# Patient Record
Sex: FEMALE | Race: BLACK OR AFRICAN AMERICAN | NOT HISPANIC OR LATINO | Employment: OTHER | RURAL
[De-identification: names, ages, dates, MRNs, and addresses within clinical notes are randomized per-mention and may not be internally consistent; named-entity substitution may affect disease eponyms.]

---

## 2022-08-07 ENCOUNTER — OFFICE VISIT (OUTPATIENT)
Dept: FAMILY MEDICINE | Facility: CLINIC | Age: 42
End: 2022-08-07
Payer: OTHER GOVERNMENT

## 2022-08-07 VITALS
HEIGHT: 60 IN | SYSTOLIC BLOOD PRESSURE: 128 MMHG | TEMPERATURE: 98 F | WEIGHT: 155 LBS | BODY MASS INDEX: 30.43 KG/M2 | RESPIRATION RATE: 18 BRPM | DIASTOLIC BLOOD PRESSURE: 74 MMHG | HEART RATE: 72 BPM

## 2022-08-07 DIAGNOSIS — J02.9 PHARYNGITIS, UNSPECIFIED ETIOLOGY: Primary | ICD-10-CM

## 2022-08-07 DIAGNOSIS — Z20.822 EXPOSURE TO COVID-19 VIRUS: ICD-10-CM

## 2022-08-07 LAB
CTP QC/QA: YES
SARS-COV-2 AG RESP QL IA.RAPID: NEGATIVE

## 2022-08-07 PROCEDURE — 99203 PR OFFICE/OUTPT VISIT, NEW, LEVL III, 30-44 MIN: ICD-10-PCS | Mod: ,,, | Performed by: FAMILY MEDICINE

## 2022-08-07 PROCEDURE — 99203 OFFICE O/P NEW LOW 30 MIN: CPT | Mod: ,,, | Performed by: FAMILY MEDICINE

## 2022-08-07 PROCEDURE — 87426 SARS CORONAVIRUS 2 ANTIGEN POCT: ICD-10-PCS | Mod: QW,,, | Performed by: FAMILY MEDICINE

## 2022-08-07 PROCEDURE — 87426 SARSCOV CORONAVIRUS AG IA: CPT | Mod: QW,,, | Performed by: FAMILY MEDICINE

## 2022-08-07 RX ORDER — AZITHROMYCIN 250 MG/1
TABLET, FILM COATED ORAL
Qty: 6 TABLET | Refills: 0 | Status: SHIPPED | OUTPATIENT
Start: 2022-08-07 | End: 2023-03-28

## 2022-08-07 RX ORDER — PREDNISONE 20 MG/1
20 TABLET ORAL DAILY
Qty: 3 TABLET | Refills: 0 | Status: SHIPPED | OUTPATIENT
Start: 2022-08-07 | End: 2022-08-10

## 2022-08-07 NOTE — PROGRESS NOTES
Subjective:       Patient ID: Reyes Schulte is a 41 y.o. female.    Chief Complaint: Sore Throat (Mild sore throat.  positive today so request testing)    Pt  positive for covid earlier today, has been around him.     Review of Systems   Constitutional: Negative for activity change, appetite change, chills, diaphoresis, fatigue, fever and unexpected weight change.   HENT: Positive for nasal congestion and sore throat. Negative for dental problem, drooling, ear discharge, ear pain, facial swelling, hearing loss, mouth sores, nosebleeds, postnasal drip, rhinorrhea, sinus pressure/congestion, sneezing, tinnitus, trouble swallowing, voice change and goiter.    Eyes: Negative for photophobia, discharge, itching and visual disturbance.   Respiratory: Negative for apnea, cough, choking, chest tightness, shortness of breath, wheezing and stridor.    Cardiovascular: Negative for chest pain, palpitations, leg swelling and claudication.   Gastrointestinal: Negative for abdominal distention, abdominal pain, anal bleeding, blood in stool, change in bowel habit, constipation, diarrhea, nausea, vomiting and change in bowel habit.   Endocrine: Negative for cold intolerance, heat intolerance, polydipsia, polyphagia and polyuria.   Genitourinary: Negative for bladder incontinence, decreased urine volume, difficulty urinating, dysuria, enuresis, flank pain, frequency, hematuria, nocturia, pelvic pain and urgency.   Musculoskeletal: Negative for arthralgias, back pain, gait problem, joint swelling, leg pain, myalgias, neck pain, neck stiffness and joint deformity.   Integumentary:  Negative for pallor, rash, wound, breast mass and breast tenderness.   Allergic/Immunologic: Negative for environmental allergies, food allergies and immunocompromised state.   Neurological: Negative for dizziness, vertigo, tremors, seizures, syncope, facial asymmetry, speech difficulty, weakness, light-headedness, numbness,  headaches, disturbances in coordination, memory loss and coordination difficulties.   Hematological: Negative for adenopathy. Does not bruise/bleed easily.   Psychiatric/Behavioral: Negative for agitation, behavioral problems, confusion, decreased concentration, dysphoric mood, hallucinations, self-injury, sleep disturbance and suicidal ideas. The patient is not nervous/anxious and is not hyperactive.    Breast: Negative for mass and tenderness        Objective:      Physical Exam  Vitals reviewed.   Constitutional:       Appearance: Normal appearance.   HENT:      Head: Normocephalic and atraumatic.      Right Ear: Tympanic membrane, ear canal and external ear normal.      Left Ear: Tympanic membrane, ear canal and external ear normal.      Nose: Congestion and rhinorrhea present.      Mouth/Throat:      Mouth: Mucous membranes are moist.      Pharynx: Oropharynx is clear. Posterior oropharyngeal erythema present.   Eyes:      Extraocular Movements: Extraocular movements intact.      Conjunctiva/sclera: Conjunctivae normal.      Pupils: Pupils are equal, round, and reactive to light.   Cardiovascular:      Rate and Rhythm: Normal rate and regular rhythm.      Pulses: Normal pulses.      Heart sounds: Normal heart sounds.   Pulmonary:      Effort: Pulmonary effort is normal.      Breath sounds: Normal breath sounds.   Abdominal:      General: Bowel sounds are normal.      Palpations: Abdomen is soft.   Musculoskeletal:         General: Normal range of motion.      Cervical back: Normal range of motion and neck supple.   Skin:     General: Skin is warm and dry.   Neurological:      General: No focal deficit present.      Mental Status: She is alert. Mental status is at baseline.   Psychiatric:         Mood and Affect: Mood normal.         Behavior: Behavior normal.         Thought Content: Thought content normal.         Judgment: Judgment normal.         Assessment:       1. Pharyngitis, unspecified etiology    2.  Exposure to COVID-19 virus        Plan:     Pharyngitis, unspecified etiology    Exposure to COVID-19 virus  -     SARS Coronavirus 2 Antigen, POCT    Other orders  -     azithromycin (Z-ANA) 250 MG tablet; Take 2 tablets by mouth on day 1; Take 1 tablet by mouth on days 2-5  Dispense: 6 tablet; Refill: 0  -     predniSONE (DELTASONE) 20 MG tablet; Take 1 tablet (20 mg total) by mouth once daily. for 3 days  Dispense: 3 tablet; Refill: 0       Covid negative. Due to exposure will recommend taking home covid test tomorrow morning.

## 2022-08-07 NOTE — LETTER
August 7, 2022      [facility]  [address] [address]  Phone: [phone]  Fax: [phone]       Patient: Reyes Schulte   YOB: 1980  Date of Visit: 08/07/2022    To Whom It May Concern:    Sima Schulte  was at [facility] on 08/07/2022. The patient may return to work/school on 08/09/2022 with no restrictions. If you have any questions or concerns, or if I can be of further assistance, please do not hesitate to contact me.    Sincerely,    WILTON Puente CMA

## 2022-10-25 ENCOUNTER — OFFICE VISIT (OUTPATIENT)
Dept: FAMILY MEDICINE | Facility: CLINIC | Age: 42
End: 2022-10-25
Payer: OTHER GOVERNMENT

## 2022-10-25 VITALS
HEART RATE: 71 BPM | RESPIRATION RATE: 17 BRPM | HEIGHT: 60 IN | BODY MASS INDEX: 32.79 KG/M2 | DIASTOLIC BLOOD PRESSURE: 71 MMHG | WEIGHT: 167 LBS | SYSTOLIC BLOOD PRESSURE: 116 MMHG | OXYGEN SATURATION: 99 % | TEMPERATURE: 98 F

## 2022-10-25 DIAGNOSIS — R52 BODY ACHES: ICD-10-CM

## 2022-10-25 DIAGNOSIS — J32.9 SINUSITIS, UNSPECIFIED CHRONICITY, UNSPECIFIED LOCATION: Primary | ICD-10-CM

## 2022-10-25 LAB
CTP QC/QA: YES
FLUAV AG NPH QL: NEGATIVE
FLUBV AG NPH QL: NEGATIVE

## 2022-10-25 PROCEDURE — 87804 POCT INFLUENZA A/B: ICD-10-PCS | Mod: QW,,, | Performed by: FAMILY MEDICINE

## 2022-10-25 PROCEDURE — 99214 OFFICE O/P EST MOD 30 MIN: CPT | Mod: 25,,, | Performed by: FAMILY MEDICINE

## 2022-10-25 PROCEDURE — 99214 PR OFFICE/OUTPT VISIT, EST, LEVL IV, 30-39 MIN: ICD-10-PCS | Mod: 25,,, | Performed by: FAMILY MEDICINE

## 2022-10-25 PROCEDURE — 96372 THER/PROPH/DIAG INJ SC/IM: CPT | Mod: ,,, | Performed by: FAMILY MEDICINE

## 2022-10-25 PROCEDURE — 96372 PR INJECTION,THERAP/PROPH/DIAG2ST, IM OR SUBCUT: ICD-10-PCS | Mod: ,,, | Performed by: FAMILY MEDICINE

## 2022-10-25 PROCEDURE — 87804 INFLUENZA ASSAY W/OPTIC: CPT | Mod: QW,,, | Performed by: FAMILY MEDICINE

## 2022-10-25 RX ORDER — PRAZOSIN HYDROCHLORIDE 2 MG/1
CAPSULE ORAL
COMMUNITY
Start: 2022-01-24 | End: 2023-01-25

## 2022-10-25 RX ORDER — CHOLECALCIFEROL (VITAMIN D3) 50 MCG
50 TABLET ORAL
COMMUNITY
Start: 2022-01-04 | End: 2023-03-28

## 2022-10-25 RX ORDER — PREDNISONE 20 MG/1
20 TABLET ORAL DAILY
Qty: 5 TABLET | Refills: 0 | Status: SHIPPED | OUTPATIENT
Start: 2022-10-25 | End: 2022-10-30

## 2022-10-25 RX ORDER — DEXAMETHASONE SODIUM PHOSPHATE 4 MG/ML
4 INJECTION, SOLUTION INTRA-ARTICULAR; INTRALESIONAL; INTRAMUSCULAR; INTRAVENOUS; SOFT TISSUE
Status: COMPLETED | OUTPATIENT
Start: 2022-10-25 | End: 2022-10-25

## 2022-10-25 RX ORDER — TRAZODONE HYDROCHLORIDE 100 MG/1
TABLET ORAL
COMMUNITY
Start: 2022-01-24 | End: 2023-01-25

## 2022-10-25 RX ORDER — ONDANSETRON 8 MG/1
TABLET, ORALLY DISINTEGRATING ORAL
COMMUNITY
Start: 2021-11-01 | End: 2022-11-02

## 2022-10-25 RX ORDER — AMOXICILLIN AND CLAVULANATE POTASSIUM 875; 125 MG/1; MG/1
1 TABLET, FILM COATED ORAL 2 TIMES DAILY
Qty: 14 TABLET | Refills: 0 | Status: SHIPPED | OUTPATIENT
Start: 2022-10-25 | End: 2022-11-01

## 2022-10-25 RX ADMIN — DEXAMETHASONE SODIUM PHOSPHATE 4 MG: 4 INJECTION, SOLUTION INTRA-ARTICULAR; INTRALESIONAL; INTRAMUSCULAR; INTRAVENOUS; SOFT TISSUE at 11:10

## 2022-10-25 NOTE — LETTER
October 25, 2022      [facility]  [address] [address]  Phone: [phone]  Fax: [phone]       Patient: Reyes Schulte   YOB: 1980  Date of Visit: 10/25/2022    To Whom It May Concern:    Sima Schulte  was at [facility] on 10/25/2022. The patient may return to work/school on 10/27/2022 with no restrictions. If you have any questions or concerns, or if I can be of further assistance, please do not hesitate to contact me.    Sincerely,    Nellie Narayan LPN

## 2023-03-13 ENCOUNTER — LAB VISIT (OUTPATIENT)
Dept: PRIMARY CARE CLINIC | Facility: CLINIC | Age: 43
End: 2023-03-13
Payer: OTHER GOVERNMENT

## 2023-03-13 DIAGNOSIS — Z11.1 SCREENING-PULMONARY TB: Primary | ICD-10-CM

## 2023-03-13 DIAGNOSIS — Z78.9 UNKNOWN VARICELLA VACCINATION STATUS: ICD-10-CM

## 2023-03-13 DIAGNOSIS — Z78.9 MEASLES, MUMPS, RUBELLA (MMR) VACCINATION STATUS UNKNOWN: ICD-10-CM

## 2023-03-13 LAB — RUBV IGG SER-ACNC: 104.9 IU/ML

## 2023-03-13 PROCEDURE — 86481 TB AG RESPONSE T-CELL SUSP: CPT

## 2023-03-13 PROCEDURE — 86787 VARICELLA-ZOSTER ANTIBODY: CPT | Mod: ,,, | Performed by: NURSE PRACTITIONER

## 2023-03-13 PROCEDURE — 36415 PR COLLECTION VENOUS BLOOD,VENIPUNCTURE: ICD-10-PCS | Mod: ,,, | Performed by: NURSE PRACTITIONER

## 2023-03-13 PROCEDURE — 86765 RUBEOLA ANTIBODY: CPT | Mod: ,,, | Performed by: NURSE PRACTITIONER

## 2023-03-13 PROCEDURE — 86787 VARICELLA-ZOSTER ANTIBODY: CPT

## 2023-03-13 PROCEDURE — 86480 PR  TB TEST, CELL MEDIATED ANTIGEN RESPONSE,GAMMA INTERFRON: ICD-10-PCS | Mod: ,,, | Performed by: NURSE PRACTITIONER

## 2023-03-13 PROCEDURE — 86762 RUBELLA ANTIBODY: CPT

## 2023-03-13 PROCEDURE — 86765 PR  RUBEOLA: ICD-10-PCS | Mod: ,,, | Performed by: NURSE PRACTITIONER

## 2023-03-13 PROCEDURE — 86735 PR  MUMPS: ICD-10-PCS | Mod: ,,, | Performed by: NURSE PRACTITIONER

## 2023-03-13 PROCEDURE — 86762 RUBELLA ANTIBODY: CPT | Mod: ,,, | Performed by: NURSE PRACTITIONER

## 2023-03-13 PROCEDURE — 86765 RUBEOLA ANTIBODY: CPT

## 2023-03-13 PROCEDURE — 86480 TB TEST CELL IMMUN MEASURE: CPT | Mod: ,,, | Performed by: NURSE PRACTITIONER

## 2023-03-13 PROCEDURE — 86762 PR  RUBELLA: ICD-10-PCS | Mod: ,,, | Performed by: NURSE PRACTITIONER

## 2023-03-13 PROCEDURE — 86787 PR  VARICELLA-ZOSTER: ICD-10-PCS | Mod: ,,, | Performed by: NURSE PRACTITIONER

## 2023-03-13 PROCEDURE — 36415 COLL VENOUS BLD VENIPUNCTURE: CPT | Mod: ,,, | Performed by: NURSE PRACTITIONER

## 2023-03-13 PROCEDURE — 86735 MUMPS ANTIBODY: CPT

## 2023-03-13 PROCEDURE — 86735 MUMPS ANTIBODY: CPT | Mod: ,,, | Performed by: NURSE PRACTITIONER

## 2023-03-13 NOTE — PROGRESS NOTES
Subjective:       Patient ID: Reyes Schulte is a 42 y.o. female.    Chief Complaint: No chief complaint on file.    HPI  Review of Systems      Objective:      Physical Exam    Assessment:       Problem List Items Addressed This Visit    None  Visit Diagnoses       Screening-pulmonary TB    -  Primary    Relevant Orders    Quantiferon Gold TB    Measles, mumps, rubella (MMR) vaccination status unknown        Relevant Orders    Mumps, IgG Screen    Rubeola antibody IgG    Rubella antibody, IgG    Unknown varicella vaccination status        Relevant Orders    Varicella Zoster Antibody, IgG            Plan:       MMR Varicella and TB gold only

## 2023-03-15 LAB
MEASLES IGG INDEX: 1.45
MEV IGG SER QL: POSITIVE
MUMPS AB IGG INDEX: 0.75
MUV IGG SER QL IA: NEGATIVE
VARICELLA ZOSTER, BLOOD: POSITIVE
VZV IGG INDEX: >3.5 (ref 0–0.9)

## 2023-03-20 LAB
MITOGEN MINUS NIL RESULT, TB: >10
NIL RESULT, TB: 0.02
QUANTIFERON-TB GOLD PLUS RESULT: NEGATIVE
TB1 AG MINUS NIL RESULT: 0
TB2 AG MINUS NIL RESULT: 0

## 2023-03-28 ENCOUNTER — OFFICE VISIT (OUTPATIENT)
Dept: FAMILY MEDICINE | Facility: CLINIC | Age: 43
End: 2023-03-28
Payer: OTHER GOVERNMENT

## 2023-03-28 VITALS
TEMPERATURE: 98 F | OXYGEN SATURATION: 100 % | WEIGHT: 156 LBS | HEART RATE: 78 BPM | BODY MASS INDEX: 30.63 KG/M2 | HEIGHT: 60 IN | DIASTOLIC BLOOD PRESSURE: 82 MMHG | RESPIRATION RATE: 19 BRPM | SYSTOLIC BLOOD PRESSURE: 122 MMHG

## 2023-03-28 DIAGNOSIS — J01.00 ACUTE NON-RECURRENT MAXILLARY SINUSITIS: Primary | ICD-10-CM

## 2023-03-28 DIAGNOSIS — J02.9 SORE THROAT: ICD-10-CM

## 2023-03-28 DIAGNOSIS — R05.1 ACUTE COUGH: ICD-10-CM

## 2023-03-28 DIAGNOSIS — Z20.828 EXPOSURE TO VIRAL DISEASE: ICD-10-CM

## 2023-03-28 PROBLEM — G47.8 SLEEP DYSFUNCTION WITH AROUSAL DISTURBANCE: Status: ACTIVE | Noted: 2023-03-28

## 2023-03-28 PROBLEM — R06.83 SNORING: Status: ACTIVE | Noted: 2023-03-28

## 2023-03-28 PROBLEM — M21.40 PES PLANUS: Status: ACTIVE | Noted: 2023-03-28

## 2023-03-28 PROBLEM — F32.9 MAJOR DEPRESSIVE DISORDER: Status: ACTIVE | Noted: 2023-03-28

## 2023-03-28 PROBLEM — F10.20 ALCOHOL DEPENDENCE: Status: ACTIVE | Noted: 2023-03-28

## 2023-03-28 PROBLEM — F41.9 ANXIETY: Status: ACTIVE | Noted: 2023-03-28

## 2023-03-28 PROBLEM — G89.29 CHRONIC THORACIC BACK PAIN: Status: ACTIVE | Noted: 2023-03-28

## 2023-03-28 PROBLEM — M76.50 PATELLAR TENDINITIS: Status: ACTIVE | Noted: 2023-03-28

## 2023-03-28 PROBLEM — M54.6 CHRONIC THORACIC BACK PAIN: Status: ACTIVE | Noted: 2023-03-28

## 2023-03-28 LAB
CTP QC/QA: YES
CTP QC/QA: YES
FLUAV AG NPH QL: NEGATIVE
FLUBV AG NPH QL: NEGATIVE
S PYO RRNA THROAT QL PROBE: NEGATIVE
SARS-COV-2 AG RESP QL IA.RAPID: NEGATIVE

## 2023-03-28 PROCEDURE — 99213 PR OFFICE/OUTPT VISIT, EST, LEVL III, 20-29 MIN: ICD-10-PCS | Mod: 25,,, | Performed by: NURSE PRACTITIONER

## 2023-03-28 PROCEDURE — 96372 PR INJECTION,THERAP/PROPH/DIAG2ST, IM OR SUBCUT: ICD-10-PCS | Mod: ,,, | Performed by: NURSE PRACTITIONER

## 2023-03-28 PROCEDURE — 87880 POCT RAPID STREP A: ICD-10-PCS | Mod: QW,,, | Performed by: NURSE PRACTITIONER

## 2023-03-28 PROCEDURE — 99213 OFFICE O/P EST LOW 20 MIN: CPT | Mod: 25,,, | Performed by: NURSE PRACTITIONER

## 2023-03-28 PROCEDURE — 96372 THER/PROPH/DIAG INJ SC/IM: CPT | Mod: ,,, | Performed by: NURSE PRACTITIONER

## 2023-03-28 PROCEDURE — 87880 STREP A ASSAY W/OPTIC: CPT | Mod: QW,,, | Performed by: NURSE PRACTITIONER

## 2023-03-28 PROCEDURE — 87428 SARSCOV & INF VIR A&B AG IA: CPT | Mod: QW,,, | Performed by: NURSE PRACTITIONER

## 2023-03-28 PROCEDURE — 87428 POCT SARS-COV2 (COVID) WITH FLU ANTIGEN: ICD-10-PCS | Mod: QW,,, | Performed by: NURSE PRACTITIONER

## 2023-03-28 RX ORDER — DEXAMETHASONE SODIUM PHOSPHATE 4 MG/ML
8 INJECTION, SOLUTION INTRA-ARTICULAR; INTRALESIONAL; INTRAMUSCULAR; INTRAVENOUS; SOFT TISSUE ONCE
Status: COMPLETED | OUTPATIENT
Start: 2023-03-28 | End: 2023-03-28

## 2023-03-28 RX ORDER — PROMETHAZINE HYDROCHLORIDE AND DEXTROMETHORPHAN HYDROBROMIDE 6.25; 15 MG/5ML; MG/5ML
5 SYRUP ORAL EVERY 6 HOURS PRN
Qty: 240 ML | Refills: 0 | Status: SHIPPED | OUTPATIENT
Start: 2023-03-28 | End: 2023-04-07

## 2023-03-28 RX ORDER — CEFUROXIME AXETIL 500 MG/1
500 TABLET ORAL EVERY 12 HOURS
Qty: 14 TABLET | Refills: 0 | Status: SHIPPED | OUTPATIENT
Start: 2023-03-28 | End: 2023-04-04

## 2023-03-28 RX ORDER — FEXOFENADINE HCL AND PSEUDOEPHEDRINE HCI 60; 120 MG/1; MG/1
1 TABLET, EXTENDED RELEASE ORAL 2 TIMES DAILY
Qty: 20 TABLET | Refills: 0 | Status: SHIPPED | OUTPATIENT
Start: 2023-03-28 | End: 2023-04-07

## 2023-03-28 RX ADMIN — DEXAMETHASONE SODIUM PHOSPHATE 8 MG: 4 INJECTION, SOLUTION INTRA-ARTICULAR; INTRALESIONAL; INTRAMUSCULAR; INTRAVENOUS; SOFT TISSUE at 05:03

## 2023-03-28 NOTE — PROGRESS NOTES
Subjective:       Patient ID: Reyes Schulte is a 42 y.o. female.    Chief Complaint: Cough, Sore Throat, Chills, and Nasal Congestion    Cough, Sore Throat, Chills, and Nasal Congestion      Cough  Associated symptoms include chills and a sore throat. Pertinent negatives include no chest pain, ear pain, fever, headaches, rash or shortness of breath.   Sore Throat   Associated symptoms include congestion and coughing. Pertinent negatives include no abdominal pain, ear pain, headaches, neck pain, shortness of breath or vomiting.   Review of Systems   Constitutional:  Positive for chills. Negative for appetite change, fatigue and fever.   HENT:  Positive for nasal congestion and sore throat. Negative for ear pain.    Eyes:  Negative for pain, discharge and itching.   Respiratory:  Positive for cough. Negative for shortness of breath.    Cardiovascular:  Negative for chest pain and leg swelling.   Gastrointestinal:  Negative for abdominal pain, change in bowel habit, nausea, vomiting and change in bowel habit.   Musculoskeletal:  Negative for back pain, gait problem and neck pain.   Integumentary:  Negative for rash and wound.   Allergic/Immunologic: Negative for immunocompromised state.   Neurological:  Negative for dizziness, weakness and headaches.   All other systems reviewed and are negative.      Objective:      Physical Exam  Vitals and nursing note reviewed.   Constitutional:       General: She is not in acute distress.     Appearance: Normal appearance. She is not ill-appearing, toxic-appearing or diaphoretic.   HENT:      Head: Normocephalic.      Right Ear: Tympanic membrane, ear canal and external ear normal.      Left Ear: Tympanic membrane, ear canal and external ear normal.      Nose: Mucosal edema, congestion and rhinorrhea present.      Right Turbinates: Swollen.      Left Turbinates: Swollen.      Right Sinus: Maxillary sinus tenderness and frontal sinus tenderness present.      Left Sinus:  Maxillary sinus tenderness and frontal sinus tenderness present.      Mouth/Throat:      Mouth: Mucous membranes are moist.      Pharynx: Posterior oropharyngeal erythema present. No oropharyngeal exudate.   Eyes:      General: No scleral icterus.        Right eye: No discharge.         Left eye: No discharge.      Extraocular Movements: Extraocular movements intact.      Conjunctiva/sclera: Conjunctivae normal.      Pupils: Pupils are equal, round, and reactive to light.   Cardiovascular:      Rate and Rhythm: Normal rate and regular rhythm.      Pulses: Normal pulses.      Heart sounds: Normal heart sounds. No murmur heard.  Pulmonary:      Effort: Pulmonary effort is normal. No respiratory distress.      Breath sounds: Normal breath sounds. No wheezing, rhonchi or rales.   Musculoskeletal:         General: Normal range of motion.      Cervical back: Neck supple. No tenderness.   Lymphadenopathy:      Cervical: No cervical adenopathy.   Skin:     General: Skin is warm and dry.      Capillary Refill: Capillary refill takes less than 2 seconds.      Findings: No rash.   Neurological:      Mental Status: She is alert and oriented to person, place, and time.   Psychiatric:         Mood and Affect: Mood normal.         Behavior: Behavior normal.         Thought Content: Thought content normal.         Judgment: Judgment normal.          Assessment:       1. Acute non-recurrent maxillary sinusitis    2. Sore throat    3. Exposure to viral disease    4. Acute cough          Plan:   Acute non-recurrent maxillary sinusitis  -     dexAMETHasone injection 8 mg  -     cefUROXime (CEFTIN) 500 MG tablet; Take 1 tablet (500 mg total) by mouth every 12 (twelve) hours. for 7 days  Dispense: 14 tablet; Refill: 0  -     fexofenadine-pseudoephedrine  mg (ALLEGRA-D)  mg per tablet; Take 1 tablet by mouth 2 (two) times daily. for 10 days  Dispense: 20 tablet; Refill: 0    Sore throat  -     POCT rapid strep A    Exposure to  viral disease  -     POCT SARS-COV2 (COVID) with Flu Antigen    Acute cough  -     promethazine-dextromethorphan (PROMETHAZINE-DM) 6.25-15 mg/5 mL Syrp; Take 5 mLs by mouth every 6 (six) hours as needed (cough).  Dispense: 240 mL; Refill: 0         Risks, benefits, and side effects were discussed with the patient. All questions were answered to the fullest satisfaction of the patient, and pt verbalized understanding and agreement to treatment plan. Pt was to call with any new or worsening symptoms, or present to the ER

## 2023-03-28 NOTE — LETTER
March 28, 2023      [facility]  [address] [address]  Phone: [phone]  Fax: [phone]       Patient: Reyes Schulte   YOB: 1980  Date of Visit: 03/28/2023    To Whom It May Concern:    Sima Schulte  was at [facility] on 03/28/2023. The patient may return to work/school on 03/30/2023 without restrictions. If you have any questions or concerns, or if I can be of further assistance, please do not hesitate to contact me.    Sincerely,    FADUMO Staley

## 2023-07-03 PROBLEM — J01.00 ACUTE NON-RECURRENT MAXILLARY SINUSITIS: Status: RESOLVED | Noted: 2023-03-28 | Resolved: 2023-07-03

## 2023-07-13 ENCOUNTER — OFFICE VISIT (OUTPATIENT)
Dept: FAMILY MEDICINE | Facility: CLINIC | Age: 43
End: 2023-07-13
Payer: OTHER GOVERNMENT

## 2023-07-13 VITALS
WEIGHT: 156 LBS | HEIGHT: 60 IN | RESPIRATION RATE: 16 BRPM | OXYGEN SATURATION: 98 % | HEART RATE: 73 BPM | BODY MASS INDEX: 30.63 KG/M2 | SYSTOLIC BLOOD PRESSURE: 107 MMHG | DIASTOLIC BLOOD PRESSURE: 68 MMHG | TEMPERATURE: 98 F

## 2023-07-13 DIAGNOSIS — M54.30 SCIATICA, UNSPECIFIED LATERALITY: Primary | ICD-10-CM

## 2023-07-13 PROCEDURE — 99213 PR OFFICE/OUTPT VISIT, EST, LEVL III, 20-29 MIN: ICD-10-PCS | Mod: 25,,, | Performed by: FAMILY MEDICINE

## 2023-07-13 PROCEDURE — 96372 PR INJECTION,THERAP/PROPH/DIAG2ST, IM OR SUBCUT: ICD-10-PCS | Mod: ,,, | Performed by: FAMILY MEDICINE

## 2023-07-13 PROCEDURE — 96372 THER/PROPH/DIAG INJ SC/IM: CPT | Mod: ,,, | Performed by: FAMILY MEDICINE

## 2023-07-13 PROCEDURE — 99213 OFFICE O/P EST LOW 20 MIN: CPT | Mod: 25,,, | Performed by: FAMILY MEDICINE

## 2023-07-13 RX ORDER — METHYLPREDNISOLONE ACETATE 40 MG/ML
40 INJECTION, SUSPENSION INTRA-ARTICULAR; INTRALESIONAL; INTRAMUSCULAR; SOFT TISSUE
Status: COMPLETED | OUTPATIENT
Start: 2023-07-13 | End: 2023-07-13

## 2023-07-13 RX ORDER — TRAMADOL HYDROCHLORIDE 50 MG/1
50 TABLET ORAL EVERY 6 HOURS PRN
COMMUNITY
Start: 2023-06-24

## 2023-07-13 RX ORDER — NAPROXEN 500 MG/1
500 TABLET ORAL 2 TIMES DAILY WITH MEALS
Qty: 10 TABLET | Refills: 0 | Status: SHIPPED | OUTPATIENT
Start: 2023-07-13 | End: 2023-07-18

## 2023-07-13 RX ORDER — BROMPHENIRAMINE MALEATE, PSEUDOEPHEDRINE HYDROCHLORIDE, AND DEXTROMETHORPHAN HYDROBROMIDE 2; 30; 10 MG/5ML; MG/5ML; MG/5ML
SYRUP ORAL
COMMUNITY
Start: 2022-08-10

## 2023-07-13 RX ORDER — OFLOXACIN 3 MG/ML
SOLUTION/ DROPS OPHTHALMIC
COMMUNITY
Start: 2023-06-24

## 2023-07-13 RX ORDER — DICLOFENAC SODIUM 10 MG/G
2 GEL TOPICAL 4 TIMES DAILY
Qty: 50 G | Refills: 0 | Status: SHIPPED | OUTPATIENT
Start: 2023-07-13

## 2023-07-13 RX ORDER — KETOROLAC TROMETHAMINE 30 MG/ML
30 INJECTION, SOLUTION INTRAMUSCULAR; INTRAVENOUS
Status: COMPLETED | OUTPATIENT
Start: 2023-07-13 | End: 2023-07-13

## 2023-07-13 RX ADMIN — KETOROLAC TROMETHAMINE 30 MG: 30 INJECTION, SOLUTION INTRAMUSCULAR; INTRAVENOUS at 11:07

## 2023-07-13 RX ADMIN — METHYLPREDNISOLONE ACETATE 40 MG: 40 INJECTION, SUSPENSION INTRA-ARTICULAR; INTRALESIONAL; INTRAMUSCULAR; SOFT TISSUE at 11:07

## 2023-07-13 NOTE — PROGRESS NOTES
Patient examined, record reviewed, agree with findings and recommendations as documented above.   Advancement-Rotation Flap Text: The defect edges were debeveled with a #15 scalpel blade.  Given the location of the defect, shape of the defect and the proximity to free margins an advancement-rotation flap was deemed most appropriate.  Using a sterile surgical marker, an appropriate flap was drawn incorporating the defect and placing the expected incisions within the relaxed skin tension lines where possible. The area thus outlined was incised deep to adipose tissue with a #15 scalpel blade.  The skin margins were undermined to an appropriate distance in all directions utilizing iris scissors.

## 2023-07-13 NOTE — PROGRESS NOTES
Subjective:       Patient ID: Reyes Schulte is a 42 y.o. female.    Chief Complaint: Pain (LBP left side radiates to left buttock and down back of leg to foot, progressive x 2 days, no history of truama or fall, PCP followed [facility])    Patient is a 43 y/o female who presents to the clinic with complaint of left lower back pain that runs down to her posterior left leg. Patient states that the pain started 1-2 days ago and has been constant and 6-7/10 in intensity. She describes pain as throbbing and running down on her posterior left leg to her left foot. She denies aggravating or alleviating factors associated. She denies falls or any other trauma. Patient states that she has been having pain in her left foot for a long time and has been f/u with podiatry. She reports f/u with PCP and podiatry at the [facility]. Patient denies fever, chills, nausea, vomiting, SOB, chest pain, urinary or bowel habit changes.        Current Outpatient Medications:     brompheniramine-pseudoeph-DM (BROMFED DM) 2-30-10 mg/5 mL Syrp, , Disp: , Rfl:     traMADoL (ULTRAM) 50 mg tablet, Take 50 mg by mouth every 6 (six) hours as needed., Disp: , Rfl:     diclofenac sodium (VOLTAREN) 1 % Gel, Apply 2 g topically 4 (four) times daily., Disp: 50 g, Rfl: 0    naproxen (NAPROSYN) 500 MG tablet, Take 1 tablet (500 mg total) by mouth 2 (two) times daily with meals. for 5 days, Disp: 10 tablet, Rfl: 0    ofloxacin (OCUFLOX) 0.3 % ophthalmic solution, Place into the right eye., Disp: , Rfl:     prazosin (MINIPRESS) 2 MG Cap, TAKE TWO CAPSULES BY MOUTH AT BEDTIME FOR SLEEP, NIGHTMARES, SWEATS., Disp: , Rfl:     traZODone (DESYREL) 100 MG tablet, TAKE ONE TABLET BY MOUTH AT BEDTIME FOR INSOMNIA-MAY INCREASE TO 150MG IF NEEDED, Disp: , Rfl:   No current facility-administered medications for this visit.    Review of patient's allergies indicates:  No Known Allergies    Past Medical History:   Diagnosis Date    Alcohol dependence with  intoxication, unspecified     Anxiety     Back pain     Depression     Migraine     Neuralgia     PTSD (post-traumatic stress disorder)        History reviewed. No pertinent surgical history.    Family History   Problem Relation Age of Onset    Hypertension Father        Social History     Tobacco Use    Smoking status: Never     Passive exposure: Never    Smokeless tobacco: Never   Substance Use Topics    Alcohol use: Yes     Comment: social    Drug use: Never       Review of Systems   Constitutional:  Negative for activity change, appetite change, chills, fatigue and fever.   HENT:  Negative for nasal congestion, ear discharge, ear pain, hearing loss, mouth sores, rhinorrhea, sinus pressure/congestion, sneezing, sore throat and trouble swallowing.    Eyes:  Negative for pain, discharge, redness, itching and visual disturbance.   Respiratory:  Negative for cough, shortness of breath, wheezing and stridor.    Cardiovascular:  Negative for chest pain, palpitations and leg swelling.   Gastrointestinal:  Negative for abdominal distention, abdominal pain, blood in stool, change in bowel habit, constipation, diarrhea, nausea, vomiting and change in bowel habit.   Endocrine: Negative for polydipsia, polyphagia and polyuria.   Genitourinary:  Negative for decreased urine volume, difficulty urinating, dysuria, flank pain, frequency, hematuria and urgency.   Musculoskeletal:  Positive for back pain and leg pain (left). Negative for myalgias, neck pain and neck stiffness.   Integumentary:  Negative for color change, pallor, rash and wound.   Neurological:  Negative for dizziness, vertigo, tremors, seizures, speech difficulty, weakness, light-headedness, numbness and headaches.   Psychiatric/Behavioral:  Negative for behavioral problems, confusion, decreased concentration and sleep disturbance. The patient is not nervous/anxious.        Current Medications:   Medication List with Changes/Refills   New Medications     DICLOFENAC SODIUM (VOLTAREN) 1 % GEL    Apply 2 g topically 4 (four) times daily.       Start Date: 7/13/2023 End Date: --    NAPROXEN (NAPROSYN) 500 MG TABLET    Take 1 tablet (500 mg total) by mouth 2 (two) times daily with meals. for 5 days       Start Date: 7/13/2023 End Date: 7/18/2023   Current Medications    BROMPHENIRAMINE-PSEUDOEPH-DM (BROMFED DM) 2-30-10 MG/5 ML SYRP           Start Date: 8/10/2022 End Date: --    OFLOXACIN (OCUFLOX) 0.3 % OPHTHALMIC SOLUTION    Place into the right eye.       Start Date: 6/24/2023 End Date: --    PRAZOSIN (MINIPRESS) 2 MG CAP    TAKE TWO CAPSULES BY MOUTH AT BEDTIME FOR SLEEP, NIGHTMARES, SWEATS.       Start Date: 1/24/2022 End Date: 1/25/2023    TRAMADOL (ULTRAM) 50 MG TABLET    Take 50 mg by mouth every 6 (six) hours as needed.       Start Date: 6/24/2023 End Date: --    TRAZODONE (DESYREL) 100 MG TABLET    TAKE ONE TABLET BY MOUTH AT BEDTIME FOR INSOMNIA-MAY INCREASE TO 150MG IF NEEDED       Start Date: 1/24/2022 End Date: 1/25/2023            Objective:        Vitals:    07/13/23 1052   BP: 107/68   BP Location: Left arm   Patient Position: Sitting   BP Method: Medium (Automatic)   Pulse: 73   Resp: 16   Temp: 98 °F (36.7 °C)   TempSrc: Oral   SpO2: 98%   Weight: 70.8 kg (156 lb)   Height: 5' (1.524 m)       Physical Exam  Constitutional:       General: She is not in acute distress.     Appearance: Normal appearance. She is not toxic-appearing.   HENT:      Head: Normocephalic and atraumatic.      Right Ear: External ear normal.      Left Ear: External ear normal.      Nose: Nose normal.      Mouth/Throat:      Pharynx: Oropharynx is clear.   Eyes:      Extraocular Movements: Extraocular movements intact.      Conjunctiva/sclera: Conjunctivae normal.      Pupils: Pupils are equal, round, and reactive to light.   Cardiovascular:      Rate and Rhythm: Normal rate and regular rhythm.      Pulses: Normal pulses.      Heart sounds: Normal heart sounds.   Pulmonary:       Effort: Pulmonary effort is normal. No respiratory distress.      Breath sounds: Normal breath sounds. No wheezing, rhonchi or rales.   Abdominal:      General: Bowel sounds are normal. There is no distension.      Tenderness: There is no abdominal tenderness. There is no right CVA tenderness, left CVA tenderness, guarding or rebound.   Musculoskeletal:         General: Tenderness (lower back) present. Normal range of motion.      Cervical back: Normal range of motion and neck supple.      Right lower leg: No edema.      Left lower leg: No edema.   Skin:     General: Skin is warm and dry.      Findings: No rash.   Neurological:      Mental Status: She is alert and oriented to person, place, and time. Mental status is at baseline.   Psychiatric:         Mood and Affect: Mood normal.         Behavior: Behavior normal.         Thought Content: Thought content normal.         Judgment: Judgment normal.             No results found for: WBC, HGB, HCT, PLT, CHOL, TRIG, HDL, LDLDIRECT, ALT, AST, NA, K, CL, CREATININE, BUN, CO2, TSH, PSA, INR, GLUF, HGBA1C, MICROALBUR   Assessment:       1. Sciatica, unspecified laterality        Plan:         Problem List Items Addressed This Visit          Orthopedic    Sciatica - Primary     Recommend Ketorolac 30mg IM and Methylprednisolone 40mg IM in clinic today  Apply local heating or ice pads  Apply Voltaren gel QID  Avoid heavy lifting or strenuous exercises for the next week  Referral to PT  Continue to f/u with PCP and podiatry  RTC 1 week or sooner in case no improvement or worsening of symptoms           Relevant Medications    ketorolac injection 30 mg (Completed)    methylPREDNISolone acetate injection 40 mg (Completed)    Other Relevant Orders    Ambulatory referral/consult to Physical/Occupational Therapy         Follow up in about 1 week (around 7/20/2023), or if symptoms worsen or fail to improve.    Jayson Hendrix MD     Instructed patient that if symptoms fail to improve  or worsen patient should seek immediate medical attention or report to the nearest emergency department. Patient expressed verbal agreement and understanding to this plan of care.

## 2023-07-13 NOTE — ASSESSMENT & PLAN NOTE
Recommend Ketorolac 30mg IM and Methylprednisolone 40mg IM in clinic today  Apply local heating or ice pads  Apply Voltaren gel QID  Avoid heavy lifting or strenuous exercises for the next week  Referral to PT  Continue to f/u with PCP and podiatry  RTC 1 week or sooner in case no improvement or worsening of symptoms

## 2023-08-28 DIAGNOSIS — K59.04 CHRONIC IDIOPATHIC CONSTIPATION: Primary | ICD-10-CM

## 2023-09-29 ENCOUNTER — OFFICE VISIT (OUTPATIENT)
Dept: GASTROENTEROLOGY | Facility: CLINIC | Age: 43
End: 2023-09-29
Payer: OTHER GOVERNMENT

## 2023-09-29 VITALS
HEIGHT: 60 IN | DIASTOLIC BLOOD PRESSURE: 76 MMHG | HEART RATE: 66 BPM | SYSTOLIC BLOOD PRESSURE: 128 MMHG | BODY MASS INDEX: 31.61 KG/M2 | WEIGHT: 161 LBS

## 2023-09-29 DIAGNOSIS — K59.00 CONSTIPATION, UNSPECIFIED CONSTIPATION TYPE: Primary | ICD-10-CM

## 2023-09-29 DIAGNOSIS — K92.1 BLOOD IN STOOL: ICD-10-CM

## 2023-09-29 DIAGNOSIS — K59.04 CHRONIC IDIOPATHIC CONSTIPATION: ICD-10-CM

## 2023-09-29 PROCEDURE — 86364 TISSUE TRANSGLUTAMINASE AB, IGA: ICD-10-PCS | Mod: 90,,, | Performed by: CLINICAL MEDICAL LABORATORY

## 2023-09-29 PROCEDURE — 99204 PR OFFICE/OUTPT VISIT, NEW, LEVL IV, 45-59 MIN: ICD-10-PCS | Mod: S$PBB,,, | Performed by: NURSE PRACTITIONER

## 2023-09-29 PROCEDURE — 99213 OFFICE O/P EST LOW 20 MIN: CPT | Mod: PBBFAC | Performed by: NURSE PRACTITIONER

## 2023-09-29 PROCEDURE — 86364 TISS TRNSGLTMNASE EA IG CLAS: CPT | Mod: 90,,, | Performed by: CLINICAL MEDICAL LABORATORY

## 2023-09-29 PROCEDURE — 99204 OFFICE O/P NEW MOD 45 MIN: CPT | Mod: S$PBB,,, | Performed by: NURSE PRACTITIONER

## 2023-09-29 NOTE — PROGRESS NOTES
Reyes Schulte is a 42 y.o. female here for Constipation        PCP: No, Primary Doctor  Referring Provider: No referring provider defined for this encounter.     HPI:  Presents as a new patient due to report of constipation.  Patient states that she has chronic constipation.  [facility] she is currently taking Trulance.  States that she is unable to take Trulance daily because of her work schedule.  Previously failed MiraLax powder daily.  We did discuss increasing MiraLax powder to 17 g twice per day, on the days that she is unable to take Trulance.  She does endorse intermittent rectal bleeding.  No previous colonoscopy.  Denies any abdominal pain.  No weight loss.    Constipation  Pertinent negatives include no abdominal pain, diarrhea, fever, nausea or vomiting.         ROS:  Review of Systems   Constitutional:  Negative for appetite change, fatigue, fever and unexpected weight change.   HENT:  Negative for trouble swallowing.    Gastrointestinal:  Positive for anal bleeding, blood in stool and constipation. Negative for abdominal pain, change in bowel habit, diarrhea, nausea, vomiting and reflux.   Musculoskeletal:  Negative for gait problem.   Integumentary:  Negative for pallor.   Hematological:  Does not bruise/bleed easily.   Psychiatric/Behavioral:  The patient is not nervous/anxious.           PMHX:  has a past medical history of Alcohol dependence with intoxication, unspecified, Anxiety, Back pain, Depression, Migraine, Neuralgia, and PTSD (post-traumatic stress disorder).    PSHX:  has no past surgical history on file.    PFHX: family history includes Hypertension in her father.    PSlHX:  reports that she has never smoked. She has never been exposed to tobacco smoke. She has never used smokeless tobacco. She reports current alcohol use. She reports that she does not use drugs.        Review of patient's allergies indicates:  No Known  "Allergies    Medication List with Changes/Refills   Current Medications    BROMPHENIRAMINE-PSEUDOEPH-DM (BROMFED DM) 2-30-10 MG/5 ML SYRP        DICLOFENAC SODIUM (VOLTAREN) 1 % GEL    Apply 2 g topically 4 (four) times daily.    OFLOXACIN (OCUFLOX) 0.3 % OPHTHALMIC SOLUTION    Place into the right eye.    PLECANATIDE (TRULANCE) 3 MG TAB    Take 3 mg by mouth once daily.    PRAZOSIN (MINIPRESS) 2 MG CAP    TAKE TWO CAPSULES BY MOUTH AT BEDTIME FOR SLEEP, NIGHTMARES, SWEATS.    TRAMADOL (ULTRAM) 50 MG TABLET    Take 50 mg by mouth every 6 (six) hours as needed.    TRAZODONE (DESYREL) 100 MG TABLET    TAKE ONE TABLET BY MOUTH AT BEDTIME FOR INSOMNIA-MAY INCREASE TO 150MG IF NEEDED        Objective Findings:  Vital Signs:  /76   Pulse 66   Ht 5' (1.524 m)   Wt 73 kg (161 lb)   BMI 31.44 kg/m²  Body mass index is 31.44 kg/m².    Physical Exam:  Physical Exam  Vitals and nursing note reviewed.   Constitutional:       General: She is not in acute distress.     Appearance: Normal appearance.   HENT:      Mouth/Throat:      Mouth: Mucous membranes are moist.   Cardiovascular:      Rate and Rhythm: Normal rate.   Pulmonary:      Effort: Pulmonary effort is normal.      Breath sounds: No wheezing, rhonchi or rales.   Abdominal:      General: Bowel sounds are normal. There is no distension.      Palpations: Abdomen is soft. There is no mass.      Tenderness: There is no abdominal tenderness. There is no guarding.   Skin:     General: Skin is warm and dry.      Coloration: Skin is not jaundiced or pale.   Neurological:      Mental Status: She is alert and oriented to person, place, and time.   Psychiatric:         Mood and Affect: Mood normal.          Labs:  No results found for: "WBC", "HGB", "HCT", "MCV", "RDW", "PLT", "GRAN", "LYMPH", "MONO", "EOS", "BASO"  No results found for: "NA", "K", "CL", "CO2", "GLU", "BUN", "CREATININE", "CALCIUM", "PROT", "ALBUMIN", "BILITOT", "ALKPHOS", "AST", "ALT"      Imaging: No " results found.      Assessment:  Reyes Schulte is a 42 y.o. female here with:  1. Constipation, unspecified constipation type    2. Blood in stool          Recommendations:  1. MiraLax powder 17 g twice daily in 8 oz of liquid, every 3 days may take milk of magnesia  2. Schedule colonoscopy due to rectal bleeding  3. TSH, free T4, celiac labs, CBC, CMP    Follow up in about 3 months (around 12/29/2023).      Order summary:  Orders Placed This Encounter    TSH    T4, Free    IgA    Tissue Transglutaminase Ab, IgA    Endomysial antibody, IgA titer    Gliadin (Deamidated) Ab, Eval    CBC Auto Differential    Comprehensive Metabolic Panel    Colonoscopy       Thank you for allowing me to participate in the care of Reyes Schulte.      AARON Fuentes

## 2023-09-29 NOTE — PATIENT INSTRUCTIONS
Miralax powder 17 grams twice daily, Milk of Magnesia every 3 rd day  Kiwi 2 fruit per day  Increase water intake to 64 ounces daily

## 2023-10-02 LAB — TTG IGA SER IA-ACNC: <1.2 U/ML

## 2023-10-03 ENCOUNTER — TELEPHONE (OUTPATIENT)
Dept: GASTROENTEROLOGY | Facility: CLINIC | Age: 43
End: 2023-10-03
Payer: OTHER GOVERNMENT

## 2023-10-03 NOTE — TELEPHONE ENCOUNTER
Results and recommendations called to patient. Verbalized understanding.            ----- Message from FADUMO Oropeza sent at 10/3/2023  7:20 AM CDT -----  Thyroid labs are normal.  Creatinine is minimally elevated at 1.05.  Increase water intake.  So far celiac labs are normal 1 test is still pending.

## 2023-10-04 ENCOUNTER — TELEPHONE (OUTPATIENT)
Dept: GASTROENTEROLOGY | Facility: CLINIC | Age: 43
End: 2023-10-04
Payer: OTHER GOVERNMENT

## 2023-10-04 NOTE — TELEPHONE ENCOUNTER
Results called to patient. Verbalized understanding.            ----- Message from FADUMO Oropeza sent at 10/3/2023  3:18 PM CDT -----  Endomysial is negative.  No sign of celiac.

## 2023-12-29 ENCOUNTER — HOSPITAL ENCOUNTER (OUTPATIENT)
Dept: GASTROENTEROLOGY | Facility: HOSPITAL | Age: 43
Discharge: HOME | End: 2023-12-29
Attending: NURSE PRACTITIONER
Payer: OTHER GOVERNMENT

## 2023-12-29 ENCOUNTER — ANESTHESIA (OUTPATIENT)
Dept: GASTROENTEROLOGY | Facility: HOSPITAL | Age: 43
End: 2023-12-29
Payer: OTHER GOVERNMENT

## 2023-12-29 ENCOUNTER — ANESTHESIA EVENT (OUTPATIENT)
Dept: GASTROENTEROLOGY | Facility: HOSPITAL | Age: 43
End: 2023-12-29
Payer: OTHER GOVERNMENT

## 2023-12-29 VITALS
HEART RATE: 69 BPM | DIASTOLIC BLOOD PRESSURE: 37 MMHG | RESPIRATION RATE: 17 BRPM | HEART RATE: 74 BPM | OXYGEN SATURATION: 99 % | DIASTOLIC BLOOD PRESSURE: 34 MMHG | SYSTOLIC BLOOD PRESSURE: 81 MMHG | OXYGEN SATURATION: 97 % | TEMPERATURE: 97 F | SYSTOLIC BLOOD PRESSURE: 83 MMHG

## 2023-12-29 DIAGNOSIS — Z12.11 SCREENING FOR MALIGNANT NEOPLASM OF COLON: Primary | ICD-10-CM

## 2023-12-29 DIAGNOSIS — K92.1 BLOOD IN STOOL: ICD-10-CM

## 2023-12-29 DIAGNOSIS — K62.1 POLYP OF RECTUM: ICD-10-CM

## 2023-12-29 DIAGNOSIS — K57.30 DIVERTICULOSIS OF COLON: ICD-10-CM

## 2023-12-29 LAB
B-HCG UR QL: NEGATIVE
CTP QC/QA: YES

## 2023-12-29 PROCEDURE — 00811 ANES LWR INTST NDSC NOS: CPT

## 2023-12-29 PROCEDURE — 45380 COLONOSCOPY AND BIOPSY: CPT | Mod: 33,,, | Performed by: INTERNAL MEDICINE

## 2023-12-29 PROCEDURE — 45380 PR COLONOSCOPY,BIOPSY: ICD-10-PCS | Mod: 33,,, | Performed by: INTERNAL MEDICINE

## 2023-12-29 PROCEDURE — 88305 SURGICAL PATHOLOGY: ICD-10-PCS | Mod: 26,,, | Performed by: PATHOLOGY

## 2023-12-29 PROCEDURE — 45380 COLONOSCOPY AND BIOPSY: CPT | Mod: PT | Performed by: INTERNAL MEDICINE

## 2023-12-29 PROCEDURE — D9220A PRA ANESTHESIA: Mod: 33,,, | Performed by: ANESTHESIOLOGY

## 2023-12-29 PROCEDURE — D9220A PRA ANESTHESIA: ICD-10-PCS | Mod: 33,,, | Performed by: ANESTHESIOLOGY

## 2023-12-29 PROCEDURE — 37000008 HC ANESTHESIA 1ST 15 MINUTES

## 2023-12-29 PROCEDURE — 88305 TISSUE EXAM BY PATHOLOGIST: CPT | Mod: 26,,, | Performed by: PATHOLOGY

## 2023-12-29 PROCEDURE — 27201423 OPTIME MED/SURG SUP & DEVICES STERILE SUPPLY

## 2023-12-29 PROCEDURE — 25000003 PHARM REV CODE 250: Performed by: NURSE ANESTHETIST, CERTIFIED REGISTERED

## 2023-12-29 PROCEDURE — 27000716 HC OXISENSOR PROBE, ANY SIZE: Performed by: NURSE ANESTHETIST, CERTIFIED REGISTERED

## 2023-12-29 PROCEDURE — 88305 TISSUE EXAM BY PATHOLOGIST: CPT | Mod: TC,SUR | Performed by: INTERNAL MEDICINE

## 2023-12-29 PROCEDURE — 37000009 HC ANESTHESIA EA ADD 15 MINS

## 2023-12-29 PROCEDURE — 81025 URINE PREGNANCY TEST: CPT | Performed by: INTERNAL MEDICINE

## 2023-12-29 PROCEDURE — 27000284 HC CANNULA NASAL: Performed by: NURSE ANESTHETIST, CERTIFIED REGISTERED

## 2023-12-29 PROCEDURE — 63600175 PHARM REV CODE 636 W HCPCS: Performed by: NURSE ANESTHETIST, CERTIFIED REGISTERED

## 2023-12-29 RX ORDER — SODIUM CHLORIDE 0.9 % (FLUSH) 0.9 %
10 SYRINGE (ML) INJECTION
Status: DISCONTINUED | OUTPATIENT
Start: 2023-12-29 | End: 2023-12-30 | Stop reason: HOSPADM

## 2023-12-29 RX ORDER — PROPOFOL 10 MG/ML
VIAL (ML) INTRAVENOUS
Status: DISCONTINUED | OUTPATIENT
Start: 2023-12-29 | End: 2023-12-29

## 2023-12-29 RX ORDER — LIDOCAINE HYDROCHLORIDE 20 MG/ML
INJECTION, SOLUTION EPIDURAL; INFILTRATION; INTRACAUDAL; PERINEURAL
Status: DISCONTINUED | OUTPATIENT
Start: 2023-12-29 | End: 2023-12-29

## 2023-12-29 RX ADMIN — LIDOCAINE HYDROCHLORIDE 100 MG: 20 INJECTION, SOLUTION INTRAVENOUS at 10:12

## 2023-12-29 RX ADMIN — PROPOFOL 50 MG: 10 INJECTION, EMULSION INTRAVENOUS at 10:12

## 2023-12-29 RX ADMIN — SODIUM CHLORIDE: 9 INJECTION, SOLUTION INTRAVENOUS at 10:12

## 2023-12-29 RX ADMIN — PROPOFOL 100 MG: 10 INJECTION, EMULSION INTRAVENOUS at 10:12

## 2023-12-29 NOTE — ANESTHESIA PREPROCEDURE EVALUATION
12/29/2023  Reyes Schulte is a 43 y.o., female.  Past Medical History:   Diagnosis Date    Alcohol dependence with intoxication, unspecified     Anxiety     Back pain     Depression     Migraine     Neuralgia     PTSD (post-traumatic stress disorder)        History reviewed. No pertinent surgical history.    Family History   Problem Relation Age of Onset    Hypertension Father        Social History     Socioeconomic History    Marital status:    Tobacco Use    Smoking status: Never     Passive exposure: Never    Smokeless tobacco: Never   Substance and Sexual Activity    Alcohol use: Yes     Comment: social    Drug use: Never    Sexual activity: Yes       Current Outpatient Medications   Medication Sig Dispense Refill    brompheniramine-pseudoeph-DM (BROMFED DM) 2-30-10 mg/5 mL Syrp       diclofenac sodium (VOLTAREN) 1 % Gel Apply 2 g topically 4 (four) times daily. 50 g 0    ofloxacin (OCUFLOX) 0.3 % ophthalmic solution Place into the right eye.      plecanatide (TRULANCE) 3 mg Tab Take 3 mg by mouth once daily.      prazosin (MINIPRESS) 2 MG Cap TAKE TWO CAPSULES BY MOUTH AT BEDTIME FOR SLEEP, NIGHTMARES, SWEATS.      traMADoL (ULTRAM) 50 mg tablet Take 50 mg by mouth every 6 (six) hours as needed.      traZODone (DESYREL) 100 MG tablet TAKE ONE TABLET BY MOUTH AT BEDTIME FOR INSOMNIA-MAY INCREASE TO 150MG IF NEEDED       No current facility-administered medications for this encounter.       Review of patient's allergies indicates:  No Known Allergies      Pre-op Assessment    I have reviewed the Patient Summary Reports.     I have reviewed the Nursing Notes. I have reviewed the NPO Status.   I have reviewed the Medications.     Review of Systems  Anesthesia Hx:  No problems with previous Anesthesia             Denies Family Hx of Anesthesia complications.    Denies Personal Hx of  Anesthesia complications.                    Hematology/Oncology:    Oncology Normal                                   EENT/Dental:  EENT/Dental Normal           Cardiovascular:                hyperlipidemia                             Pulmonary:        Sleep Apnea                Renal/:  Renal/ Normal                 Hepatic/GI:  Bowel Prep.                Musculoskeletal:  Musculoskeletal Normal                Neurological:    Neuromuscular Disease,  Headaches                                 Endocrine:  Diabetes, type 2         Obesity / BMI > 30  Dermatological:  Skin Normal    Psych:  Psychiatric History                  Physical Exam  General: Well nourished, Alert, Oriented and Cooperative    Airway:  Mouth Opening: Normal  Neck ROM: Normal ROM    Chest/Lungs:  Normal Respiratory Rate    Heart:  Rate: Normal        Anesthesia Plan  Type of Anesthesia, risks & benefits discussed:    Anesthesia Type: Gen Natural Airway, MAC  Intra-op Monitoring Plan: Standard ASA Monitors  Post Op Pain Control Plan: multimodal analgesia and IV/PO Opioids PRN  Induction:  IV  Informed Consent: Informed consent signed with the Patient and all parties understand the risks and agree with anesthesia plan.  All questions answered. Patient consented to blood products? Yes  ASA Score: 3  Day of Surgery Review of History & Physical: I have interviewed and examined the patient. I have reviewed the patient's H&P dated: There are no significant changes.     Ready For Surgery From Anesthesia Perspective.     .

## 2023-12-29 NOTE — TRANSFER OF CARE
Anesthesia Transfer of Care Note    Patient: Reyes Schulte    Procedure(s) Performed: * No procedures listed *    Patient location: GI    Anesthesia Type: general    Transport from OR: Transported from OR on room air with adequate spontaneous ventilation. Continuous ECG monitoring in transport. Continuous SpO2 monitoring in transport    Post pain: adequate analgesia    Post assessment: no apparent anesthetic complications    Post vital signs: stable    Level of consciousness: sedated and responds to stimulation    Nausea/Vomiting: no nausea/vomiting    Complications: none    Transfer of care protocol was followedComments: Good SV continue, NAD, VSS, RTRN      Last vitals: Visit Vitals  BP (!) 116/55 (BP Location: Left arm, Patient Position: Lying)   Pulse 75   Temp 36.1 °C (97 °F) (Skin)   Resp 19   SpO2 99%   Breastfeeding No

## 2023-12-29 NOTE — ANESTHESIA POSTPROCEDURE EVALUATION
Anesthesia Post Evaluation    Patient: Reyes Schulte    Procedure(s) Performed: * No procedures listed *    Final Anesthesia Type: general      Patient location during evaluation: GI PACU  Patient participation: Yes- Able to Participate  Level of consciousness: awake and alert  Post-procedure vital signs: reviewed and stable  Pain management: adequate  Airway patency: patent    PONV status at discharge: No PONV  Anesthetic complications: no      Cardiovascular status: blood pressure returned to baseline and hemodynamically stable  Respiratory status: spontaneous ventilation  Hydration status: euvolemic  Follow-up not needed.  Comments: Refer to nursing notes for pain/emi score upon discharge from recovery.              Vitals Value Taken Time   /81 12/29/23 1245   Temp 36.1 °C (97 °F) 12/29/23 1031   Pulse 79 12/29/23 1258   Resp 10 12/29/23 1258   SpO2 96 % 12/29/23 1245   Vitals shown include unvalidated device data.      Event Time   Out of Recovery 11:15:00         Pain/Emi Score: Emi Score: 10 (12/29/2023 11:05 AM)

## 2023-12-29 NOTE — H&P
[facility]  Gastroenterology  H&P    Patient Name: Reyes Schulte  MRN: [id number]  Admission Date: 12/29/2023  Code Status: Full Code    Attending Provider: Alaina Canales FNP   Primary Care Physician: No, Primary Doctor  Principal Problem:<principal problem not specified>    Subjective:     History of Present Illness: Pt presents for her first screening colonoscopy with chronic history of constipation and she's had blood in the stool.    Past Medical History:   Diagnosis Date    Alcohol dependence with intoxication, unspecified     Anxiety     Back pain     Depression     Migraine     Neuralgia     PTSD (post-traumatic stress disorder)        History reviewed. No pertinent surgical history.    Review of patient's allergies indicates:  Not on File  Family History       Problem Relation (Age of Onset)    Hypertension Father          Tobacco Use    Smoking status: Never     Passive exposure: Never    Smokeless tobacco: Never   Substance and Sexual Activity    Alcohol use: Yes     Comment: social    Drug use: Never    Sexual activity: Yes     Review of Systems   Respiratory: Negative.     Cardiovascular: Negative.    Gastrointestinal:  Positive for blood in stool and constipation.     Objective:     Vital Signs (Most Recent):  Pulse: 78 (12/29/23 0947)  Resp: 15 (12/29/23 0947)  BP: (!) 116/55 (12/29/23 0947)  SpO2: 96 % (12/29/23 0947) Vital Signs (24h Range):  Pulse:  [78] 78  Resp:  [15] 15  SpO2:  [96 %] 96 %  BP: (116)/(55) 116/55        There is no height or weight on file to calculate BMI.    No intake or output data in the 24 hours ending 12/29/23 1010    Lines/Drains/Airways       Peripheral Intravenous Line  Duration                  Peripheral IV - Single Lumen 12/29/23 0947 22 G Anterior;Left;Proximal Forearm <1 day                    Physical Exam  Vitals reviewed.   Constitutional:       General: She is not in acute distress.     Appearance: Normal appearance. She is  "well-developed. She is not ill-appearing.   HENT:      Head: Normocephalic and atraumatic.      Nose: Nose normal.   Eyes:      Pupils: Pupils are equal, round, and reactive to light.   Cardiovascular:      Rate and Rhythm: Normal rate and regular rhythm.   Pulmonary:      Effort: Pulmonary effort is normal.      Breath sounds: Normal breath sounds. No wheezing.   Abdominal:      General: Abdomen is flat. Bowel sounds are normal. There is no distension.      Palpations: Abdomen is soft.      Tenderness: There is no abdominal tenderness. There is no guarding.   Skin:     General: Skin is warm and dry.      Coloration: Skin is not jaundiced.   Neurological:      Mental Status: She is alert.   Psychiatric:         Attention and Perception: Attention normal.         Mood and Affect: Affect normal.         Speech: Speech normal.         Behavior: Behavior is cooperative.      Comments: Pt was calm while speaking.         Significant Labs:  CBC: No results for input(s): "WBC", "HGB", "HCT", "PLT" in the last 48 hours.  CMP: No results for input(s): "GLU", "CALCIUM", "ALBUMIN", "PROT", "NA", "K", "CO2", "CL", "BUN", "CREATININE", "ALKPHOS", "ALT", "AST", "BILITOT" in the last 48 hours.    Significant Imaging:  Imaging results within the past 24 hours have been reviewed.    Assessment/Plan:     There are no hospital problems to display for this patient.        Imp: screening for colon cancer, constipation. Blood in the stool  Plan: colonoscopy    Fortino Moncada MD  Gastroenterology  [facility]  "

## 2023-12-29 NOTE — DISCHARGE INSTRUCTIONS
Procedure Date  12/29/23     Impression  Overall Impression:   Polyp in the rectum was removed with cold forceps biopsy  Diverticulosis in the descending colon and sigmoid colon  Mild diverticulosis was noted as above. One diminutive polyp was removed with biopsy from the rectum.     Recommendation    Await pathology results     Repeat colonoscopy in 5 years      Discharge: disp: DC to home. High fiber diet. No driving x 24 hours. F/U with PCP.   Dx: colon diverticulosis, one polyp was removed during this exam. Rectal bleeding (none today) was likely from internal hemorrhoids or an anal fissure.    Do not drive, operate machinery, signing legal documents until tomorrow.

## 2024-01-02 ENCOUNTER — TELEPHONE (OUTPATIENT)
Dept: GASTROENTEROLOGY | Facility: CLINIC | Age: 44
End: 2024-01-02
Payer: OTHER GOVERNMENT

## 2024-01-02 LAB
ESTROGEN SERPL-MCNC: NORMAL PG/ML
INSULIN SERPL-ACNC: NORMAL U[IU]/ML
LAB AP GROSS DESCRIPTION: NORMAL
LAB AP LABORATORY NOTES: NORMAL
T3RU NFR SERPL: NORMAL %

## 2024-01-02 NOTE — TELEPHONE ENCOUNTER
Results and recommendations called to patient. Verbalized understanding.            ----- Message from Fortino Moncada MD sent at 1/2/2024  4:15 PM CST -----  Colon polyp was hyperplastic.Recommend: repeat colonoscopy in 10 years for colon neoplasm screening.

## 2024-01-02 NOTE — PROGRESS NOTES
Colon polyp was hyperplastic.Recommend: repeat colonoscopy in 10 years for colon neoplasm screening.

## 2024-10-10 ENCOUNTER — HOSPITAL ENCOUNTER (EMERGENCY)
Facility: HOSPITAL | Age: 44
Discharge: HOME | End: 2024-10-11
Attending: EMERGENCY MEDICINE
Payer: OTHER GOVERNMENT

## 2024-10-10 DIAGNOSIS — S62.667A CLOSED NONDISPLACED FRACTURE OF DISTAL PHALANX OF LEFT LITTLE FINGER, INITIAL ENCOUNTER: ICD-10-CM

## 2024-10-10 DIAGNOSIS — S61.309A AVULSION OF FINGERNAIL, INITIAL ENCOUNTER: Primary | ICD-10-CM

## 2024-10-10 PROCEDURE — 99284 EMERGENCY DEPT VISIT MOD MDM: CPT | Mod: 25

## 2024-10-10 PROCEDURE — 25000003 PHARM REV CODE 250: Performed by: EMERGENCY MEDICINE

## 2024-10-10 PROCEDURE — 63600175 PHARM REV CODE 636 W HCPCS: Mod: JZ,JG | Performed by: EMERGENCY MEDICINE

## 2024-10-10 RX ORDER — SILVER NITRATE 38.21; 12.74 MG/1; MG/1
1 STICK TOPICAL ONCE
Status: COMPLETED | OUTPATIENT
Start: 2024-10-11 | End: 2024-10-10

## 2024-10-10 RX ORDER — BUPIVACAINE HYDROCHLORIDE 2.5 MG/ML
5 INJECTION, SOLUTION EPIDURAL; INFILTRATION; INTRACAUDAL
Status: COMPLETED | OUTPATIENT
Start: 2024-10-10 | End: 2024-10-10

## 2024-10-10 RX ORDER — LIDOCAINE HYDROCHLORIDE 10 MG/ML
INJECTION, SOLUTION INFILTRATION; PERINEURAL
Status: COMPLETED
Start: 2024-10-10 | End: 2024-10-10

## 2024-10-10 RX ORDER — ACETAMINOPHEN 325 MG/1
650 TABLET ORAL
Status: COMPLETED | OUTPATIENT
Start: 2024-10-10 | End: 2024-10-10

## 2024-10-10 RX ADMIN — SILVER NITRATE APPLICATORS 1 APPLICATOR: 25; 75 STICK TOPICAL at 11:10

## 2024-10-10 RX ADMIN — ACETAMINOPHEN 650 MG: 325 TABLET ORAL at 10:10

## 2024-10-10 RX ADMIN — BACITRACIN ZINC, NEOMYCIN SULFATE , POLYMYXIN B SULFATE. 1 EACH: 400; 3.5; 5 OINTMENT TOPICAL at 10:10

## 2024-10-10 RX ADMIN — BUPIVACAINE HYDROCHLORIDE 12.5 MG: 2.5 INJECTION, SOLUTION EPIDURAL; INFILTRATION; INTRACAUDAL at 10:10

## 2024-10-10 NOTE — Clinical Note
"Massielmetric "Reyes"Eris was seen and treated in our emergency department on 10/10/2024.  She may return to work on 10/12/2024.       If you have any questions or concerns, please don't hesitate to call.      ARIANA Joyce RN    "

## 2024-10-11 VITALS
BODY MASS INDEX: 31.41 KG/M2 | WEIGHT: 160 LBS | OXYGEN SATURATION: 98 % | DIASTOLIC BLOOD PRESSURE: 77 MMHG | HEIGHT: 60 IN | HEART RATE: 71 BPM | TEMPERATURE: 98 F | SYSTOLIC BLOOD PRESSURE: 126 MMHG | RESPIRATION RATE: 16 BRPM

## 2024-10-11 PROCEDURE — 11730 AVULSION NAIL PLATE SIMPLE 1: CPT

## 2024-10-11 PROCEDURE — 25000003 PHARM REV CODE 250: Performed by: EMERGENCY MEDICINE

## 2024-10-11 RX ORDER — HYDROCODONE BITARTRATE AND ACETAMINOPHEN 5; 325 MG/1; MG/1
1 TABLET ORAL EVERY 6 HOURS PRN
Qty: 12 TABLET | Refills: 0 | Status: SHIPPED | OUTPATIENT
Start: 2024-10-11

## 2024-10-11 RX ORDER — HYDROCODONE BITARTRATE AND ACETAMINOPHEN 5; 325 MG/1; MG/1
1 TABLET ORAL
Status: COMPLETED | OUTPATIENT
Start: 2024-10-11 | End: 2024-10-11

## 2024-10-11 RX ORDER — SULFAMETHOXAZOLE AND TRIMETHOPRIM 800; 160 MG/1; MG/1
1 TABLET ORAL 2 TIMES DAILY
Qty: 14 TABLET | Refills: 0 | Status: SHIPPED | OUTPATIENT
Start: 2024-10-11 | End: 2024-10-18

## 2024-10-11 RX ADMIN — HYDROCODONE BITARTRATE AND ACETAMINOPHEN 1 TABLET: 5; 325 TABLET ORAL at 12:10

## 2024-10-11 NOTE — ED PROVIDER NOTES
Encounter Date: 10/10/2024    SCRIBE #1 NOTE: I, Maren Odonnell, am scribing for, and in the presence of,  Flynn Lala MD.       History     Chief Complaint   Patient presents with    Fingernail Avulsion     Pt was pushing cart through door and jammed finger in between door and cart.     Patient is a 43 y.o. female that arrives to the ED with c/o Finger Injury.. The patient was pushing a cart and jammed her finger in between the door and cart. When examined patients has left distal ring finger nail is missing nail. The Patient appears fine sitting in the ED and tolerating pain with finger injury. There are no other issues to report with this patient at this time.     The history is provided by the patient. No  was used.     Review of patient's allergies indicates:  No Known Allergies  Past Medical History:   Diagnosis Date    Alcohol dependence with intoxication, unspecified     Anxiety     Back pain     Depression     Migraine     Neuralgia     PTSD (post-traumatic stress disorder)      History reviewed. No pertinent surgical history.  Family History   Problem Relation Name Age of Onset    Hypertension Father       Social History     Tobacco Use    Smoking status: Never     Passive exposure: Never    Smokeless tobacco: Never   Substance Use Topics    Alcohol use: Yes     Comment: social    Drug use: Never     Review of Systems   Constitutional:  Negative for chills, fatigue and fever.   Eyes:  Negative for pain.   Respiratory:  Negative for shortness of breath.    Cardiovascular:  Negative for chest pain and leg swelling.   Gastrointestinal:  Negative for abdominal pain, diarrhea, nausea and vomiting.   Endocrine: Negative for polyuria.   Genitourinary:  Negative for difficulty urinating.   Allergic/Immunologic: Negative.    All other systems reviewed and are negative.      Physical Exam     Initial Vitals   BP Pulse Resp Temp SpO2   10/10/24 2152 10/10/24 2152 10/10/24 2153 10/10/24 2152  10/10/24 2152   131/85 66 18 97.8 °F (36.6 °C) 99 %      MAP       --                Physical Exam    Nursing note and vitals reviewed.  Constitutional: She appears well-developed and well-nourished.   HENT:   Head: Normocephalic and atraumatic.   Eyes: EOM are normal. Pupils are equal, round, and reactive to light.   Neck: Neck supple. No thyromegaly present.   Normal range of motion.  Cardiovascular:  Normal rate, regular rhythm, normal heart sounds and intact distal pulses.           No murmur heard.  Pulmonary/Chest: Breath sounds normal. No respiratory distress. She has no wheezes.   Abdominal: Abdomen is soft. Bowel sounds are normal. She exhibits no distension. There is no abdominal tenderness.   Musculoskeletal:         General: No tenderness or edema. Normal range of motion.        Arms:       Cervical back: Normal range of motion and neck supple.      Comments: Patient has avulsion of nail on 4th finger (ring finger) on left hand.      Lymphadenopathy:     She has no cervical adenopathy.   Neurological: She is alert and oriented to person, place, and time. She has normal strength. No cranial nerve deficit or sensory deficit.   Skin: Skin is warm and dry. No rash noted.   Psychiatric: She has a normal mood and affect.         ED Course   Nail Removal    Date/Time: 10/11/2024 12:24 AM    Performed by: Flynn Lala MD  Authorized by: Flynn Lala MD  Anesthesia: digital block    Anesthesia:  Local Anesthetic: bupivacaine 0.25% without epinephrine  Anesthetic total: 4 mL    Patient sedated: no  Amount removed: complete  Nail bed sutured: no  Nail matrix removed: complete  Comments: Part of the nail bed had been avulsed most of the distal half of the nail bed was avulsed in the ulnar side of the proximal nail bed.  The nail had broken am was in the nail fold.  So this had to be removed as to not leave a nail spike.  The nail fold was propped up in with plastic that was sutured into place with  4-0 Prolene x2 sutures.  Hemostasis was controlled with SurgiSeal and a petroleum gauze was placed over the wound.  The wound was dressed with sterile dressing and a metal splint was applied.        Labs Reviewed - No data to display       Imaging Results              X-Ray Finger 2 or More Views Left (Final result)  Result time 10/10/24 22:36:32      Final result by Mae Moreno MD (10/10/24 22:36:32)                   Impression:      Suggestion of fracture involving the distal tip of the left distal phalanx tuft.  Overlying skin defect/laceration.      Electronically signed by: Mae Moreno  Date:    10/10/2024  Time:    22:36               Narrative:    EXAMINATION:  XR FINGER 2 OR MORE VIEWS LEFT    CLINICAL HISTORY:  left forth finger injury;    TECHNIQUE:  Three views of the left ring finger were obtained    COMPARISON:  None    FINDINGS:  There is a bony defect along the distal tuft of the is left ring finger distal phalanx suggesting incomplete fracture.  There is a soft tissue laceration defect overlying the tuft of the distal phalanx.  There is no dislocation.  There are no radiopaque foreign bodies in the soft tissues.                                       Medications   HYDROcodone-acetaminophen 5-325 mg per tablet 1 tablet (has no administration in time range)   acetaminophen tablet 650 mg (650 mg Oral Given 10/10/24 2211)   neomycin-bacitracnZn-polymyxnB packet (1 each Topical (Top) Given 10/10/24 2246)   BUPivacaine (PF) 0.25% (2.5 mg/ml) injection 12.5 mg (12.5 mg Infiltration Given 10/10/24 2246)   LIDOcaine HCL 10 mg/ml (1%) 10 mg/mL (1 %) injection (  Return to Cabinet 10/10/24 2230)   silver nitrate applicators applicator 1 applicator (1 applicator Topical (Top) Given by Other 10/10/24 2307)     Medical Decision Making            Attending Attestation:           Physician Attestation for Scribe:  Physician Attestation Statement for Scribe #1: Flynn CHANEY MD, reviewed  documentation, as scribed by Maren Odonnell in my presence, and it is both accurate and complete.                                    Clinical Impression:  Final diagnoses:  [S61.383F] Avulsion of fingernail, initial encounter (Primary)  [N99.789L] Closed nondisplaced fracture of distal phalanx of left little finger, initial encounter          ED Disposition Condition    Discharge Stable          ED Prescriptions       Medication Sig Dispense Start Date End Date Auth. Provider    HYDROcodone-acetaminophen (NORCO) 5-325 mg per tablet Take 1 tablet by mouth every 6 (six) hours as needed for Pain. 12 tablet 10/11/2024 -- Flynn Lala MD    sulfamethoxazole-trimethoprim 800-160mg (BACTRIM DS) 800-160 mg Tab Take 1 tablet by mouth 2 (two) times daily. for 7 days 14 tablet 10/11/2024 10/18/2024 Flynn Lala MD          Follow-up Information       Follow up With Specialties Details Why Contact Info    Sidney Quintanilla MD Orthopedic Surgery Schedule an appointment as soon as possible for a visit   [address] [place] [address] [address]  Sidney Quintanilla Md, Orthopedic & Sports Medicine, [facility] [address]  [phone]      [facility] Emergency Department Emergency Medicine Go to  As needed, If symptoms worsen [address] [id number] [phone]             Flynn Lala MD  10/11/24 0027

## 2024-10-14 ENCOUNTER — LAB VISIT (OUTPATIENT)
Dept: PRIMARY CARE CLINIC | Facility: CLINIC | Age: 44
End: 2024-10-14

## 2024-10-14 DIAGNOSIS — Z02.83 ENCOUNTER FOR DRUG SCREENING: Primary | ICD-10-CM

## 2024-10-14 PROCEDURE — 99000 SPECIMEN HANDLING OFFICE-LAB: CPT | Mod: ,,, | Performed by: NURSE PRACTITIONER

## 2024-10-14 NOTE — PROGRESS NOTES
Subjective     Patient ID: Reyes Schulte is a 43 y.o. female.    Chief Complaint: No chief complaint on file.    HPI  Review of Systems       Objective     Physical Exam       Assessment and Plan     1. Encounter for drug screening        Drug testing only           No follow-ups on file.

## 2024-10-23 ENCOUNTER — OFFICE VISIT (OUTPATIENT)
Dept: ORTHOPEDICS | Facility: CLINIC | Age: 44
End: 2024-10-23
Payer: COMMERCIAL

## 2024-10-23 VITALS — BODY MASS INDEX: 29.96 KG/M2 | WEIGHT: 158.69 LBS | HEIGHT: 61 IN

## 2024-10-23 DIAGNOSIS — S62.665B OPEN NONDISPLACED FRACTURE OF DISTAL PHALANX OF LEFT RING FINGER, INITIAL ENCOUNTER: Primary | ICD-10-CM

## 2024-10-23 DIAGNOSIS — S61.309A AVULSION OF FINGERNAIL, INITIAL ENCOUNTER: ICD-10-CM

## 2024-10-23 PROCEDURE — 99213 OFFICE O/P EST LOW 20 MIN: CPT | Mod: PBBFAC | Performed by: ORTHOPAEDIC SURGERY

## 2024-10-23 PROCEDURE — 99999 PR PBB SHADOW E&M-EST. PATIENT-LVL III: CPT | Mod: PBBFAC,,, | Performed by: ORTHOPAEDIC SURGERY

## 2024-10-23 NOTE — PROGRESS NOTES
Patient is here for left ring finger injury she had a crush injury to the tip of the finger and a small avulsion from the tip she had a nail bed injury.  This time she has a new nail growing in.  There is no displacement of the fracture.  I removed the sutures holding the fake nail on.  Placed her in a Stax splint.  We will follow her back up in 10-14 days.  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  She was neurovascularly intact distally

## 2024-11-04 ENCOUNTER — HOSPITAL ENCOUNTER (OUTPATIENT)
Dept: RADIOLOGY | Facility: HOSPITAL | Age: 44
Discharge: HOME | End: 2024-11-04
Attending: ORTHOPAEDIC SURGERY
Payer: OTHER GOVERNMENT

## 2024-11-04 ENCOUNTER — OFFICE VISIT (OUTPATIENT)
Dept: ORTHOPEDICS | Facility: CLINIC | Age: 44
End: 2024-11-04
Payer: COMMERCIAL

## 2024-11-04 VITALS — WEIGHT: 160.31 LBS | HEIGHT: 60 IN | BODY MASS INDEX: 31.48 KG/M2

## 2024-11-04 DIAGNOSIS — S62.665D OPEN NONDISPLACED FRACTURE OF DISTAL PHALANX OF LEFT RING FINGER WITH ROUTINE HEALING, SUBSEQUENT ENCOUNTER: Primary | ICD-10-CM

## 2024-11-04 DIAGNOSIS — M79.642 LEFT HAND PAIN: ICD-10-CM

## 2024-11-04 DIAGNOSIS — M79.642 LEFT HAND PAIN: Primary | ICD-10-CM

## 2024-11-04 PROCEDURE — 73130 X-RAY EXAM OF HAND: CPT | Mod: TC,LT

## 2024-11-04 PROCEDURE — 99999 PR PBB SHADOW E&M-EST. PATIENT-LVL III: CPT | Mod: PBBFAC,,, | Performed by: ORTHOPAEDIC SURGERY

## 2024-11-04 PROCEDURE — 73130 X-RAY EXAM OF HAND: CPT | Mod: 26,LT,, | Performed by: ORTHOPAEDIC SURGERY

## 2024-11-04 PROCEDURE — 99213 OFFICE O/P EST LOW 20 MIN: CPT | Mod: PBBFAC,25 | Performed by: ORTHOPAEDIC SURGERY

## 2024-11-04 NOTE — LETTER
November 4, 2024      [facility]  [address] [place] [id number]  Phone: [phone]  Fax: [phone]       Patient: Reyes Schulte   YOB: 1980  Date of Visit: 11/04/2024    To Whom It May Concern:    Sima Schulte  was at [facility] on 11/04/2024. The patient may return to work on 11/4/24 with restrictions. No scrubbing and return to clinic in one week. If you have any questions or concerns, or if I can be of further assistance, please do not hesitate to contact me.    Sincerely,  MD Freda Rai MA

## 2024-11-04 NOTE — PROGRESS NOTES
Radiology Interpretation        Patient Name: Reyes Schulte  Date: 11/4/2024  YOB: 1980  MRN# [id number]        ORDERING DIAGNOSIS:    Encounter Diagnosis   Name Primary?    Open nondisplaced fracture of distal phalanx of left ring finger with routine healing, subsequent encounter Yes        Three views right hand skeletally mature individual there is a fracture off of the tip of the distal phalanx of the ring finger minimal if any displacement or angulation no bony lesions impression nondisplaced fracture distal phalanx right ring finger                 Sidney Quintanilla MD

## 2024-11-04 NOTE — PROGRESS NOTES
Patient is here for follow-up of her distal phalanx injury of the left ring finger.  She was almost completely healed in the nail bed avulsion injury.  She took a very small piece of bone off the tip of the finger.  At this time there is no displaced fracture noted.  I will let her continue to protect the finger while she was working in a [employer] splint.  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  At this time she was not going to be with the scrubbed with the cases.  I will check her back in 1 week x-rays show she has a small avulsion off the tip there is no displaced fracture I do not need further films

## 2024-11-11 ENCOUNTER — OFFICE VISIT (OUTPATIENT)
Dept: ORTHOPEDICS | Facility: CLINIC | Age: 44
End: 2024-11-11
Payer: COMMERCIAL

## 2024-11-11 VITALS
WEIGHT: 161.5 LBS | HEIGHT: 61 IN | SYSTOLIC BLOOD PRESSURE: 126 MMHG | DIASTOLIC BLOOD PRESSURE: 78 MMHG | BODY MASS INDEX: 30.49 KG/M2 | HEART RATE: 66 BPM | OXYGEN SATURATION: 98 %

## 2024-11-11 DIAGNOSIS — S62.665D OPEN NONDISPLACED FRACTURE OF DISTAL PHALANX OF LEFT RING FINGER WITH ROUTINE HEALING, SUBSEQUENT ENCOUNTER: Primary | ICD-10-CM

## 2024-11-11 PROCEDURE — 99213 OFFICE O/P EST LOW 20 MIN: CPT | Mod: PBBFAC | Performed by: ORTHOPAEDIC SURGERY

## 2024-11-11 PROCEDURE — 99999 PR PBB SHADOW E&M-EST. PATIENT-LVL III: CPT | Mod: PBBFAC,,, | Performed by: ORTHOPAEDIC SURGERY

## 2024-11-11 NOTE — LETTER
November 11, 2024      [facility]  [address] [place] [id number]  Phone: [phone]  Fax: [phone]       Patient: Reyes Schulte   YOB: 1980  Date of Visit: 11/11/2024    To Whom It May Concern:    Sima Schulte  was at [facility] on 11/11/2024. The patient may return to work on 11/12/24 with no restrictions. If you have any questions or concerns, or if I can be of further assistance, please do not hesitate to contact me.    Sincerely,  MD Freda Rai MA

## 2024-11-11 NOTE — PROGRESS NOTES
Patient is here follow-up of her left hand she had the ring finger injury.  At this time she was healed over the wound.  Neurovascularly intact distally.  Her new nail is growing in in firming up.  At this time I will let her go back to being able to scrub of the finger.  I will let her go back to work without restrictions tomorrow.  I will follow back up in 3-4 weeks.

## 2024-12-02 ENCOUNTER — OFFICE VISIT (OUTPATIENT)
Dept: ORTHOPEDICS | Facility: CLINIC | Age: 44
End: 2024-12-02
Payer: OTHER GOVERNMENT

## 2024-12-02 ENCOUNTER — HOSPITAL ENCOUNTER (OUTPATIENT)
Dept: RADIOLOGY | Facility: HOSPITAL | Age: 44
Discharge: HOME | End: 2024-12-02
Attending: ORTHOPAEDIC SURGERY
Payer: OTHER GOVERNMENT

## 2024-12-02 DIAGNOSIS — S62.665D OPEN NONDISPLACED FRACTURE OF DISTAL PHALANX OF LEFT RING FINGER WITH ROUTINE HEALING, SUBSEQUENT ENCOUNTER: ICD-10-CM

## 2024-12-02 DIAGNOSIS — S62.665D OPEN NONDISPLACED FRACTURE OF DISTAL PHALANX OF LEFT RING FINGER WITH ROUTINE HEALING, SUBSEQUENT ENCOUNTER: Primary | ICD-10-CM

## 2024-12-02 PROCEDURE — 73140 X-RAY EXAM OF FINGER(S): CPT | Mod: 26,LT,, | Performed by: ORTHOPAEDIC SURGERY

## 2024-12-02 PROCEDURE — 99999 PR PBB SHADOW E&M-EST. PATIENT-LVL III: CPT | Mod: PBBFAC,,, | Performed by: ORTHOPAEDIC SURGERY

## 2024-12-02 PROCEDURE — 73140 X-RAY EXAM OF FINGER(S): CPT | Mod: TC,LT

## 2024-12-02 PROCEDURE — 99024 POSTOP FOLLOW-UP VISIT: CPT | Mod: ,,, | Performed by: ORTHOPAEDIC SURGERY

## 2024-12-02 PROCEDURE — 99213 OFFICE O/P EST LOW 20 MIN: CPT | Mod: PBBFAC,25 | Performed by: ORTHOPAEDIC SURGERY

## 2024-12-02 NOTE — PROGRESS NOTES
Radiology Interpretation        Patient Name: Reyes Schulte  Date: 12/2/2024  YOB: 1980  MRN# [id number]        ORDERING DIAGNOSIS:    Encounter Diagnosis   Name Primary?    Open nondisplaced fracture of distal phalanx of left ring finger with routine healing, subsequent encounter Yes      Three views left ring finger skeletally mature individual there is normal mineralization there is a healed fracture of the distal phalanx no bony lesions impression healed fracture distal phalanx nondisplaced left ring finger                 Sidney Quintanilla MD

## 2024-12-02 NOTE — PROGRESS NOTES
Patient is here for follow-up of her left ring finger distal phalanx fracture with a avulsion of the nail.  She was going to new nail plate in.  She has good motion of the finger.  She was back to work without restrictions.  Over time the nail fully acromion.  Let her use her hand as tolerates she is still has little bit of tenderness.  At this time she can do activities as tolerates I will put her at [facility].  She has any problems with the nail made has a revision of the nail plate but at this time she is growing in the new nail plate